# Patient Record
Sex: MALE | Race: BLACK OR AFRICAN AMERICAN | Employment: PART TIME | URBAN - METROPOLITAN AREA
[De-identification: names, ages, dates, MRNs, and addresses within clinical notes are randomized per-mention and may not be internally consistent; named-entity substitution may affect disease eponyms.]

---

## 2022-11-25 ENCOUNTER — HOSPITAL ENCOUNTER (EMERGENCY)
Age: 42
Discharge: LEFT BEFORE DISCHARGE | End: 2022-11-25
Attending: STUDENT IN AN ORGANIZED HEALTH CARE EDUCATION/TRAINING PROGRAM
Payer: MEDICAID

## 2022-11-25 ENCOUNTER — APPOINTMENT (OUTPATIENT)
Dept: GENERAL RADIOLOGY | Age: 42
End: 2022-11-25
Payer: MEDICAID

## 2022-11-25 VITALS
DIASTOLIC BLOOD PRESSURE: 74 MMHG | HEART RATE: 88 BPM | OXYGEN SATURATION: 96 % | SYSTOLIC BLOOD PRESSURE: 116 MMHG | TEMPERATURE: 97.6 F | RESPIRATION RATE: 16 BRPM

## 2022-11-25 DIAGNOSIS — R55 SYNCOPE AND COLLAPSE: Primary | ICD-10-CM

## 2022-11-25 LAB
EKG ATRIAL RATE: 64 BPM
EKG DIAGNOSIS: NORMAL
EKG P AXIS: 87 DEGREES
EKG P-R INTERVAL: 134 MS
EKG Q-T INTERVAL: 406 MS
EKG QRS DURATION: 102 MS
EKG QTC CALCULATION (BAZETT): 418 MS
EKG R AXIS: 85 DEGREES
EKG T AXIS: 89 DEGREES
EKG VENTRICULAR RATE: 64 BPM
GLUCOSE BLD-MCNC: 92 MG/DL (ref 70–99)
PERFORMED ON: NORMAL

## 2022-11-25 PROCEDURE — 93005 ELECTROCARDIOGRAM TRACING: CPT | Performed by: STUDENT IN AN ORGANIZED HEALTH CARE EDUCATION/TRAINING PROGRAM

## 2022-11-25 PROCEDURE — 99283 EMERGENCY DEPT VISIT LOW MDM: CPT

## 2022-11-25 ASSESSMENT — PAIN - FUNCTIONAL ASSESSMENT
PAIN_FUNCTIONAL_ASSESSMENT: NONE - DENIES PAIN
PAIN_FUNCTIONAL_ASSESSMENT: NONE - DENIES PAIN

## 2022-11-26 NOTE — ED NOTES
Patient wishes to leave against medical advise. Physician aware. Patient informed that they are leaving against the advice of the attending physician. Informed of risks by physician. No signs and symptoms distress noted or voiced. IV discontinued. Patient   Agreeable to sign the AMA form and copy maintained in medical records.        Sabina Matthews RN  11/25/22 2040

## 2022-11-26 NOTE — ED NOTES
Pt wished to leave AMA. Doc at bedside explaining risks of signing out AMA. Pt does not want any more labs done. Pt escorted out of our facility via [place].      Camille Matthews RN  11/25/22 2041

## 2022-11-26 NOTE — ED NOTES
[id number] [facility] RESIDENT NOTE       Date of evaluation: 11/25/2022    Chief Complaint     Other (Presents to ED after pt. States had a stressful situation and felt very hot like was about to pass out. )    of Present Illness     Magaly Gastelum is a 40 y.o. male who presents with LOC and fall. Per patient this morning he stood up and promptly passed out, states his nephew saw him fall and hit his head. He was then brought to the ED and during intake when he stood up he passed out again. States that he felt sweaty right before passing out and nothing like this has ever happened before. Denies cardiac history although he did have an Echo and holter monitor done in 2001 per care everywhere. Headache, neck pain, abdominal pain, vision changes, chest pain, SOB, abdominal pain, weakness. Pt was arrested earlier today. Review of Systems     Review of Systems   Per HPI, otherwise negative. Past Medical, Surgical, Family, and Social History     He has no past medical history on file. He has no past surgical history on file. His family history is not on file. He     Medications     Previous Medications    No medications on file     Allergies     He has No Known Allergies. Physical Exam     INITIAL VITALS: BP: 118/76, Temp: 97.6 °F (36.4 °C), Heart Rate: 88, Resp: 16, SpO2: 96 %   Physical Exam  Constitutional:       Appearance: Normal appearance. He is normal weight. HENT:      Head:      Comments: Pt refused cranial exam     Nose: Nose normal.      Mouth/Throat:      Mouth: Mucous membranes are moist.      Pharynx: Oropharynx is clear. Eyes:      Extraocular Movements: Extraocular movements intact. Conjunctiva/sclera: Conjunctivae normal.      Pupils: Pupils are equal, round, and reactive to light. Cardiovascular:      Rate and Rhythm: Normal rate and regular rhythm. Pulses: Normal pulses. Heart sounds: Normal heart sounds.    Pulmonary: Effort: Pulmonary effort is normal. No respiratory distress. Breath sounds: Normal breath sounds. No wheezing. Abdominal:      General: Abdomen is flat. There is no distension. Tenderness: There is no abdominal tenderness. There is no guarding or rebound. Musculoskeletal:         General: Normal range of motion. Cervical back: Normal range of motion and neck supple. Right lower leg: No edema. Left lower leg: No edema. Comments: LUE handcuff in place   Skin:     General: Skin is warm and dry. Capillary Refill: Capillary refill takes less than 2 seconds. Neurological:      General: No focal deficit present. Mental Status: He is alert and oriented to person, place, and time. Mental status is at baseline. DiagnosticResults     EKG   Interpreted in conjunction with emergencydepartment physician Celeste Soulier, MD  Clinical Impression:  Regular rate and rhythm, non ischemic    RADIOLOGY:  No orders to display     LABS:   Results for orders placed or performed during the hospital encounter of 11/25/22   POCT Glucose   Result Value Ref Range    POC Glucose 92 70 - 99 mg/dl    Performed on ACCU-CHEK    EKG 12 Lead   Result Value Ref Range    Ventricular Rate 64 BPM    Atrial Rate 64 BPM    P-R Interval 134 ms    QRS Duration 102 ms    Q-T Interval 406 ms    QTc Calculation (Bazett) 418 ms    P Axis 87 degrees    R Axis 85 degrees    T Axis 89 degrees    Diagnosis       EKG performed in ER and to be interpreted by ER physician. Confirmed by MD, ER ([id number]),  Arorn Rendon ([phone]) on 11/25/2022 7:42:56 PM       ED BEDSIDE ULTRASOUND:  No results found. RECENT VITALS:  BP: 118/76, Temp: 97.6 °F (36.4 °C), Heart Rate: 88,Resp: 16, SpO2: 96 %     Procedures     none    ED Course     Nursing Notes, Past Medical Hx, Past Surgical Hx, Social Hx, Allergies, and Family Hx were reviewed.        The patient was given the followingmedications:  No orders of the defined types were placed in this encounter. CONSULTS:  None    MEDICAL DECISION MAKING / ASSESSMENT / PLAN     Sonia Mishra is a 40 y.o. male who presents with syncope with standing upright. POCT glucose wnl. Pt refused labs and further medical evaluation and left AMA. This patient was also evaluated by the attending physician. All care plans were discussed and agreed upon. Clinical Impression     1.  Syncope and collapse        Disposition     PATIENT REFERRED TO:  [facility]) Pre-Services  [phone]        DISCHARGE MEDICATIONS:  New Prescriptions    No medications on file       DISPOSITION [place] 11/25/2022 09:14:24 PM     Pt left MD Mario Galo MD  Resident  11/25/22 [id number]

## 2022-11-26 NOTE — ED PROVIDER NOTES
[address] [facility] RESIDENT NOTE         Date of evaluation: 11/25/2022     Chief Complaint      Other (Presents to ED after pt. States had a stressful situation and felt very hot like was about to pass out. )     of Present Illness      Darryle Haven is a 40 y.o. male who presents with LOC and fall. Per patient this morning he stood up and promptly passed out, states his nephew saw him fall and hit his head. He was then brought to the ED and during intake when he stood up he passed out again. States that he felt sweaty right before passing out and nothing like this has ever happened before. Denies cardiac history although he did have an Echo and holter monitor done in 2001 per care everywhere. Headache, neck pain, abdominal pain, vision changes, chest pain, SOB, abdominal pain, weakness. Pt was arrested earlier today. Review of Systems      Review of Systems   Per HPI, otherwise negative. Past Medical, Surgical, Family, and Social History      He has no past medical history on file. He has no past surgical history on file. His family history is not on file. He      Medications          Previous Medications     No medications on file      Allergies      He has No Known Allergies. Physical Exam      INITIAL VITALS: BP: 118/76, Temp: 97.6 °F (36.4 °C), Heart Rate: 88, Resp: 16, SpO2: 96 %   Physical Exam  Constitutional:       Appearance: Normal appearance. He is normal weight. HENT:      Head:      Comments: Pt refused cranial exam     Nose: Nose normal.      Mouth/Throat:      Mouth: Mucous membranes are moist.      Pharynx: Oropharynx is clear. Eyes:      Extraocular Movements: Extraocular movements intact. Conjunctiva/sclera: Conjunctivae normal.      Pupils: Pupils are equal, round, and reactive to light. Cardiovascular:      Rate and Rhythm: Normal rate and regular rhythm. Pulses: Normal pulses. Heart sounds: Normal heart sounds. followingmedications:  Encounter Medications    No orders of the defined types were placed in this encounter. CONSULTS:  None     MEDICAL DECISION MAKING / ASSESSMENT / PLAN      Prem Gaxiola is a 40 y.o. male who presents with syncope with standing upright. POCT glucose wnl. Pt refused labs and further medical evaluation and left AMA. This patient was also evaluated by the attending physician. All care plans were discussed and agreed upon. Clinical Impression      1.  Syncope and collapse          Disposition      PATIENT REFERRED TO:  [facility]) Pre-Services  [phone]           DISCHARGE MEDICATIONS:      New Prescriptions     No medications on file         DISPOSITION [place] 11/25/2022 09:14:24 PM     Pt left MD Kwame Woo MD  Resident  11/25/22 1009     Kwame Evangelista MD  Resident  11/26/22 0214